# Patient Record
(demographics unavailable — no encounter records)

---

## 2020-01-01 NOTE — NBADM
[facility] Admission Note


Date of Admission


2020 at 15:33





History


This is a baby girl born at 37 and 1 weeks of gestational age via vaginal 

delivery to a 35-year-old  (G) 3 para (P) 2 -0 -0-2 mother who is blood 

type B+, hepatitis B negative, rapid plasma reagin (RPR) negative, HIV negative,

group B Streptococcus negative. Baby cried at birth. Apgar scores were 8 at one 

minute and 9 at five minutes. Baby was admitted to the Mother-Baby unit.





Physical Examination


Physical Measurements


On admission, the baby's weight is 3500 grams, length is 53 cm, and head 

circumference is 36 cm.


Vital Signs





Vital Signs








  Date Time  Temp Pulse Resp B/P (MAP) Pulse Ox O2 Delivery O2 Flow Rate FiO2


 


20 15:34  140 50   Room Air  


 


20 16:51 97.7   60/32 (41)    








General:  Positive: Active; 


   Negative: Respiratory Distress, Dysmorphic Features


HEENT:  Positive: Normocephalic, Anterior [place] Open, Positive Red Reflexes

Uziel, Nares Patent, Ears Well Formed, Ears Well Set; 


   Negative: Cleft Lip, Cleft Palate


Heart:  Positive: S1,S2; 


   Negative: Murmur


Lungs:  Positive: Good Bilateral Air Entry; 


   Negative: Grunting and Retractions, Tachypnea


Abdomen:  Positive: Soft, Bowel sounds Present; 


   Negative: Distended


Female Genitalia:  Positive: Normal Term Genitalia


Anus:  Positive: Patent


Extremities:  Positive: Full ROM Times 4, Femoral Pulses; 


   Negative: Hip Click


Skin:  Positive: Normal for Gestation, Normal Capillary Refill


Neurological:  POSITIVE: Good Tone, Positive Imani Reflex, Positive Suck Reflex, 

Positive Grasp Reflex





Asessment


Problems:  


(1) Liveborn infant by vaginal delivery





Plan


1. Admit to mother-baby unit.


2. Routine  care.


3. Parents updated on condition and plan for the baby.











EFRA CASTELLANOS DO                2020 11:17

## 2020-01-01 NOTE — DS.PDOC
[facility] Discharge Summary


General


Date of Birth


20


Date of Discharge


2020





Problem List


Problems:  


(1) Liveborn infant by vaginal delivery





Procedures During Visit


Hearing screen and BiliChek were performed.





History


This is a baby girl born at 37 and 1 weeks of gestational age via vaginal 

delivery to a 35-year-old  (G) 3 para (P) 2 -0 -0-2 mother who is blood 

type B+, hepatitis B negative, rapid plasma reagin (RPR) negative, HIV negative,

group B Streptococcus negative. Baby cried at birth. Apgar scores were 8 at one 

minute and 9 at five minutes. Baby was admitted to the Mother-Baby unit.





Exam on Admission to Nursery


Measurements on Admission


On admission, the baby's weight is 3500 grams, length is 53 cm, and head 

circumference is 36 cm.


General:  Positive: Active; 


   Negative: Respiratory Distress, Dysmorphic Features


HEENT:  Positive: Normocephalic, Anterior [place] Open, Positive Red Reflexes

Uziel, Nares Patent, Ears Well Formed, Ears Well Set; 


   Negative: Cleft Lip, Cleft Palate


Heart:  Positive: S1,S2; 


   Negative: Murmur


Lungs:  Positive: Good Bilateral Air Entry; 


   Negative: Grunting and Retractions, Tachypnea


Abdomen:  Positive: Soft, Bowel sounds Present; 


   Negative: Distended


Female Genitalia:  Positive: Normal Term Genitalia


Anus:  Positive: Patent


Extremities:  Positive: Full ROM Times 4, Femoral Pulses; 


   Negative: Hip Click


Skin:  Positive: Normal for Gestation, Jaundice (mild), Normal Capillary Refill


Neurological:  POSITIVE: Good Tone, Positive Imani Reflex, Positive Suck Reflex, 

Positive Grasp Reflex





Summary Text


On the day of discharge, the baby's weight is 3320 grams and the baby is breast-

feeding well ad chiquis.


Physical Examination was within normal limits.


The baby passed a hearing screen, received the first dose of hepatitis B vaccine

on 2020. Serum bilirubin level is 10.8 at 40 hours of life.


Discharge baby home with mother, followup as scheduled by parents with [facility].











EFRA CASTELLANOS DO                2020 10:22